# Patient Record
Sex: FEMALE | Race: WHITE | Employment: OTHER | URBAN - METROPOLITAN AREA
[De-identification: names, ages, dates, MRNs, and addresses within clinical notes are randomized per-mention and may not be internally consistent; named-entity substitution may affect disease eponyms.]

---

## 2021-01-15 ENCOUNTER — HOSPITAL ENCOUNTER (EMERGENCY)
Age: 71
Discharge: HOME | End: 2021-01-15
Attending: EMERGENCY MEDICINE
Payer: MEDICARE

## 2021-01-15 ENCOUNTER — APPOINTMENT (OUTPATIENT)
Dept: GENERAL RADIOLOGY | Age: 71
End: 2021-01-15
Attending: EMERGENCY MEDICINE
Payer: MEDICARE

## 2021-01-15 ENCOUNTER — APPOINTMENT (OUTPATIENT)
Dept: VASCULAR SURGERY | Age: 71
End: 2021-01-15
Attending: EMERGENCY MEDICINE
Payer: MEDICARE

## 2021-01-15 VITALS
OXYGEN SATURATION: 97 % | HEART RATE: 75 BPM | TEMPERATURE: 97.1 F | RESPIRATION RATE: 18 BRPM | DIASTOLIC BLOOD PRESSURE: 85 MMHG | SYSTOLIC BLOOD PRESSURE: 124 MMHG

## 2021-01-15 DIAGNOSIS — S82.831D CLOSED FRACTURE OF DISTAL END OF RIGHT FIBULA WITH ROUTINE HEALING, UNSPECIFIED FRACTURE MORPHOLOGY, SUBSEQUENT ENCOUNTER: Primary | ICD-10-CM

## 2021-01-15 DIAGNOSIS — I82.511 CHRONIC DEEP VEIN THROMBOSIS (DVT) OF RIGHT FEMORAL VEIN (HCC): ICD-10-CM

## 2021-01-15 PROCEDURE — 73630 X-RAY EXAM OF FOOT: CPT

## 2021-01-15 PROCEDURE — 73610 X-RAY EXAM OF ANKLE: CPT

## 2021-01-15 PROCEDURE — 99281 EMR DPT VST MAYX REQ PHY/QHP: CPT

## 2021-01-15 PROCEDURE — 93971 EXTREMITY STUDY: CPT

## 2021-01-15 RX ORDER — ACETAMINOPHEN 500 MG
1000 TABLET ORAL
Qty: 20 TAB | Refills: 0 | Status: SHIPPED | OUTPATIENT
Start: 2021-01-15

## 2021-01-15 RX ORDER — DICLOFENAC SODIUM 10 MG/G
4 GEL TOPICAL 4 TIMES DAILY
Qty: 100 G | Refills: 0 | Status: SHIPPED | OUTPATIENT
Start: 2021-01-15

## 2021-01-15 NOTE — DISCHARGE INSTRUCTIONS
Discuss changes to your coumadin dosing to get back into therapeutic range with your primary doctor.

## 2021-01-15 NOTE — ED TRIAGE NOTES
TRIAGE NOTE:  Patient arrives with c/o I broke my right foot a month ago and it was healing nicely since yesterday. Patient reports swelling and pain to foot. Patient denies new injury.

## 2021-01-15 NOTE — ED PROVIDER NOTES
The history is provided by the patient. Foot Pain   This is a new problem. The current episode started 12 to 24 hours ago. The problem occurs constantly. The problem has been gradually worsening. The pain is present in the right foot. The quality of the pain is described as aching. The pain is moderate. Pertinent negatives include no numbness, full range of motion and no stiffness. The symptoms are aggravated by palpation and standing. Treatments tried: wearing walking boot for reported metatarsal fracture. The treatment provided no relief. There has been no history of extremity trauma. Past Medical History:   Diagnosis Date    [facility] 5/17/2010    Crohn's disease ([facility] 5/17/2010    Pacemaker 5/17/2010    Postmenopausal 5/17/2010    Sinus bradycardia 5/17/2010       No past surgical history on file. No family history on file.     Social History     Socioeconomic History    Marital status:      Spouse name: Not on file    Number of children: Not on file    Years of education: Not on file    Highest education level: Not on file   Occupational History    Not on file   Social Needs    Financial resource strain: Not on file    Food insecurity     Worry: Not on file     Inability: Not on file    Transportation needs     Medical: Not on file     Non-medical: Not on file   Tobacco Use    Smoking status: Not on file   Substance and Sexual Activity    Alcohol use: Not on file    Drug use: Not on file    Sexual activity: Not on file   Lifestyle    Physical activity     Days per week: Not on file     Minutes per session: Not on file    Stress: Not on file   Relationships    Social connections     Talks on phone: Not on file     Gets together: Not on file     Attends Congregation service: Not on file     Active member of club or organization: Not on file     Attends meetings of clubs or organizations: Not on file     Relationship status: Not on file    Intimate partner violence     Fear of current or ex partner: Not on file     Emotionally abused: Not on file     Physically abused: Not on file     Forced sexual activity: Not on file   Other Topics Concern   • Not on file   Social History Narrative   • Not on file         ALLERGIES: Iodine    Review of Systems   Musculoskeletal: Negative for stiffness.   Neurological: Negative for numbness.   All other systems reviewed and are negative.      Vitals:    01/15/21 1356   BP: 124/85   Pulse: 75   Resp: 18   Temp: 97.1 °F (36.2 °C)   SpO2: 97%            Physical Exam  Vitals signs and nursing note reviewed.   Constitutional:       General: She is not in acute distress.     Appearance: She is well-developed.   HENT:      Head: Normocephalic and atraumatic.   Eyes:      Conjunctiva/sclera: Conjunctivae normal.   Neck:      Musculoskeletal: Neck supple.   Cardiovascular:      Rate and Rhythm: Normal rate and regular rhythm.   Pulmonary:      Effort: Pulmonary effort is normal. No respiratory distress.   Abdominal:      General: There is no distension.   Musculoskeletal: Normal range of motion.         General: No deformity.      Right ankle: She exhibits normal range of motion. Achilles tendon exhibits no pain and no defect.      Comments: Tenderness over right foot arch, lateral aspect of foot and distal ankle   Skin:     General: Skin is warm and dry.   Neurological:      Mental Status: She is alert.      Cranial Nerves: No cranial nerve deficit.   Psychiatric:         Behavior: Behavior normal.          MDM     70 y.o. female presents with right foot pain. Had reportedly broken a 5th metatarsal last  Month and has been in a walking boot. Plain films here show no foot fracture and healed distal fibula fracture so bony issue appears to have resolved. Has history of DVT and slightly subtherapeutic on last INR check at 1.7 so doppler performed but shows only chronic, non-occlusive thrombus which is unlikely to be causing symptoms.  Discussed follow up for coumadin dosing changes to get back to therapeutic range and referred to podiatry to discuss supportive footwear as it seems like her pain symptoms could be from prolonged post-op shoe and boot use. Plan to follow up with PCP as needed and return precautions discussed for worsening or new concerning symptoms.      Procedures

## 2023-05-10 RX ORDER — ACETAMINOPHEN 500 MG
TABLET ORAL EVERY 6 HOURS PRN
COMMUNITY
Start: 2021-01-15